# Patient Record
Sex: FEMALE | Race: WHITE | NOT HISPANIC OR LATINO
[De-identification: names, ages, dates, MRNs, and addresses within clinical notes are randomized per-mention and may not be internally consistent; named-entity substitution may affect disease eponyms.]

---

## 2020-03-27 ENCOUNTER — APPOINTMENT (OUTPATIENT)
Dept: FAMILY MEDICINE | Facility: CLINIC | Age: 30
End: 2020-03-27
Payer: COMMERCIAL

## 2020-03-27 ENCOUNTER — APPOINTMENT (OUTPATIENT)
Dept: FAMILY MEDICINE | Facility: CLINIC | Age: 30
End: 2020-03-27

## 2020-03-27 VITALS
HEIGHT: 62 IN | WEIGHT: 135 LBS | BODY MASS INDEX: 24.84 KG/M2 | HEART RATE: 101 BPM | OXYGEN SATURATION: 98 % | TEMPERATURE: 98.8 F

## 2020-03-27 VITALS — SYSTOLIC BLOOD PRESSURE: 99 MMHG | DIASTOLIC BLOOD PRESSURE: 70 MMHG | HEART RATE: 77 BPM

## 2020-03-27 DIAGNOSIS — Z87.2 PERSONAL HISTORY OF DISEASES OF THE SKIN AND SUBCUTANEOUS TISSUE: ICD-10-CM

## 2020-03-27 DIAGNOSIS — Z00.00 ENCOUNTER FOR GENERAL ADULT MEDICAL EXAMINATION W/OUT ABNORMAL FINDINGS: ICD-10-CM

## 2020-03-27 DIAGNOSIS — Z83.3 FAMILY HISTORY OF DIABETES MELLITUS: ICD-10-CM

## 2020-03-27 PROCEDURE — 36415 COLL VENOUS BLD VENIPUNCTURE: CPT

## 2020-03-27 PROCEDURE — 99205 OFFICE O/P NEW HI 60 MIN: CPT | Mod: 25

## 2020-03-27 RX ORDER — TACROLIMUS 0.3 MG/G
0.03 OINTMENT TOPICAL TWICE DAILY
Qty: 1 | Refills: 0 | Status: ACTIVE | COMMUNITY
Start: 2020-03-27 | End: 1900-01-01

## 2020-03-27 NOTE — REVIEW OF SYSTEMS
[Itching] : Itching [Skin Rash] : skin rash [Negative] : Heme/Lymph [de-identified] : generalized pruritic skin rash

## 2020-03-27 NOTE — HEALTH RISK ASSESSMENT
[Very Good] : ~his/her~  mood as very good [Yes] : Yes [Monthly or less (1 pt)] : Monthly or less (1 point) [1 or 2 (0 pts)] : 1 or 2 (0 points) [Never (0 pts)] : Never (0 points) [No] : In the past 12 months have you used drugs other than those required for medical reasons? No [No falls in past year] : Patient reported no falls in the past year [0] : 2) Feeling down, depressed, or hopeless: Not at all (0) [Patient declined Retinal Exam] : Patient declined Retinal Exam. [Patient declined mammogram] : Patient declined mammogram [Patient reported PAP Smear was normal] : Patient reported PAP Smear was normal [Patient declined bone density test] : Patient declined bone density test [Patient declined colonoscopy] : Patient declined colonoscopy [HIV test declined] : HIV test declined [Hepatitis C test declined] : Hepatitis C test declined [None] : None [With Family] : lives with family [# of Members in Household ___] :  household currently consist of [unfilled] member(s) [Employed] : employed [College] : College [Significant Other] : lives with significant other [Sexually Active] : sexually active [Feels Safe at Home] : Feels safe at home [Fully functional (bathing, dressing, toileting, transferring, walking, feeding)] : Fully functional (bathing, dressing, toileting, transferring, walking, feeding) [Fully functional (using the telephone, shopping, preparing meals, housekeeping, doing laundry, using] : Fully functional and needs no help or supervision to perform IADLs (using the telephone, shopping, preparing meals, housekeeping, doing laundry, using transportation, managing medications and managing finances) [Reports normal functional visual acuity (ie: able to read med bottle)] : Reports normal functional visual acuity [Smoke Detector] : smoke detector [Carbon Monoxide Detector] : carbon monoxide detector [Guns at Home] : guns at home [Safety elements used in home] : safety elements used in home [Seat Belt] :  uses seat belt [Sunscreen] : uses sunscreen [Patient/Caregiver not ready to engage] : Patient/Caregiver not ready to engage [] : No [Audit-CScore] : 0 [de-identified] : was moderately active going to the gym daily prior to rash symptoms onset [de-identified] : lean meats, protein, carbohydrates, vegetables, fruits, fast food rarely. [[id number]] : 0 [EyeExamDate] : goes to optometry every 2 years [Change in mental status noted] : No change in mental status noted [Language] : denies difficulty with language [High Risk Behavior] : no high risk behavior [Reports changes in hearing] : Reports no changes in hearing [Reports changes in vision] : Reports no changes in vision [Reports changes in dental health] : Reports no changes in dental health [Travel to Developing Areas] : does not  travel to developing areas [TB Exposure] : is not being exposed to tuberculosis [Caregiver Concerns] : does not have caregiver concerns [PapSmearDate] : 12/19 [FreeTextEntry2] : VAUGHN pina [de-identified] : monogamous relationship [de-identified] : goes every year for cleaning/check up [de-identified] : keeps ammunition locked away from gun which is also locked up.

## 2020-03-27 NOTE — COUNSELING
[Behavioral health counseling provided] : Behavioral health counseling provided [Sleep ___ hours/day] : Sleep [unfilled] hours/day [Engage in a relaxing activity] : Engage in a relaxing activity [Plan in advance] : Plan in advance [None] : None [Good understanding] : Patient has a good understanding of lifestyle changes and steps needed to achieve self management goal [de-identified] : Eczema Care: apply ointment as Rx, you can also keep skin moisturized with lotions approved for eczema skin care. \par do not scratch affected areas. choose mild soaps/shampoos approved for eczema care (without dyes) and bath or shower at least once daily, 2-3x/week, in lukewarm water for 10-15min, for optimal results, apply lotions with skin still moist. \par \par Maintain balanced diet of whole grain, fruits and vegetables, lean meats, skinless poultry, fish, beans, soy products, eggs, nuts, and low fat dairy as per FDA dietary guidelines. \par Avoid high calorie/low nutrient foods. \par vegetables/fruits- at least 5 servings/day, \par whole grains- 100% whole grain and at least 3 grams fiber/day.\par reduce/eliminate saturated fat- less than 5% on nutrition labels (ex. patel, deli meat, hot dogs, fried foods, cookies, ice cream, chips, soft drinks, etc.)\par stay within your FDA recommended daily calorie needs or use the plate method to portion intake. \par Avoid fast food and limit eating out. When eating out choose healthy alternatives (ex. grilled, baked, steamed. low fat dressings. avoid french fries).\par DO NOT CONSUME FOODS YOU ARE ALLERGIC TO DESPITE DIETARY RECOMMENDATIONS.\par \par For more information you can visit [email or web address] \par \par Incorporate regular physical activity most days of the week. \par Regular aerobic activity- moderate intensity, most days/wk, at least 30min/day- ex. brisk walk 2.5miles/hr, ballroom dancing, water aerobics, light yard work (raking/lawn mowing) actively playing basketball, biking 10miles/hr.\par Muscle strengthening and strength/resistance exercises 2-3days/wk- ex. free weights, resistance bands, your own weight (squats/pull-ups/push-ups).\par Neuro-motor exercises for balance/agility/coordination and flexibility exercises 2-3days/wk, 20-30min/day- ex. yoga and eulalia-chi.\par Bone strengthening at least 30min/day, most days of the week- ex. weights, resistance bands, running, brisk walking, jumping rope, stair climbing, tennis.\par Decrease sedentary time. \par LISTEN TO YOUR BODY AND MODIFY ACTIVITY AS NEEDED- DO NOT OVEREXERT YOURSELF DURING PHYSICAL ACTIVITY DESPITE RECOMMENDATION.\par \par For more information you can visit [email or web address].gov

## 2020-03-27 NOTE — CURRENT MEDS
[None] : Patient does not have any barriers to medication adherence [FreeTextEntry1] : no current medication

## 2020-03-27 NOTE — PLAN
[FreeTextEntry1] : labs checked today- f/u results\par EKG deferred for today r/t skin rash symptoms; patient to RTO once s/s resolve to complete as prescribed.\par Start Tacrolimus ointment as prescribed for treatment of Nummular Eczema. notify our office if any s/s side effects or adverse reaction. for serious/potentially life threatening reactions- go to nearest emergency department and then notify our office.\par Eczema care as discussed; view counseling section.\par f/u with this office in 1 week for recheck via telehealth visit as discussed.\par notify office if worsening/persistent symptoms or new onset complaints/concerns, in the event of any emergency or life threatening condition, go to the nearest emergency department and then notify office. \par patient verbalized understanding and agrees with plan of care.

## 2020-03-27 NOTE — REVIEW OF SYSTEMS
[Itching] : Itching [Skin Rash] : skin rash [Negative] : Heme/Lymph [de-identified] : generalized pruritic skin rash

## 2020-03-27 NOTE — COUNSELING
[Behavioral health counseling provided] : Behavioral health counseling provided [Sleep ___ hours/day] : Sleep [unfilled] hours/day [Engage in a relaxing activity] : Engage in a relaxing activity [Plan in advance] : Plan in advance [None] : None [Good understanding] : Patient has a good understanding of lifestyle changes and steps needed to achieve self management goal [de-identified] : Eczema Care: apply ointment as Rx, you can also keep skin moisturized with lotions approved for eczema skin care. \par do not scratch affected areas. choose mild soaps/shampoos approved for eczema care (without dyes) and bath or shower at least once daily, 2-3x/week, in lukewarm water for 10-15min, for optimal results, apply lotions with skin still moist. \par \par Maintain balanced diet of whole grain, fruits and vegetables, lean meats, skinless poultry, fish, beans, soy products, eggs, nuts, and low fat dairy as per FDA dietary guidelines. \par Avoid high calorie/low nutrient foods. \par vegetables/fruits- at least 5 servings/day, \par whole grains- 100% whole grain and at least 3 grams fiber/day.\par reduce/eliminate saturated fat- less than 5% on nutrition labels (ex. patel, deli meat, hot dogs, fried foods, cookies, ice cream, chips, soft drinks, etc.)\par stay within your FDA recommended daily calorie needs or use the plate method to portion intake. \par Avoid fast food and limit eating out. When eating out choose healthy alternatives (ex. grilled, baked, steamed. low fat dressings. avoid french fries).\par DO NOT CONSUME FOODS YOU ARE ALLERGIC TO DESPITE DIETARY RECOMMENDATIONS.\par \par For more information you can visit [email or web address] \par \par Incorporate regular physical activity most days of the week. \par Regular aerobic activity- moderate intensity, most days/wk, at least 30min/day- ex. brisk walk 2.5miles/hr, ballroom dancing, water aerobics, light yard work (raking/lawn mowing) actively playing basketball, biking 10miles/hr.\par Muscle strengthening and strength/resistance exercises 2-3days/wk- ex. free weights, resistance bands, your own weight (squats/pull-ups/push-ups).\par Neuro-motor exercises for balance/agility/coordination and flexibility exercises 2-3days/wk, 20-30min/day- ex. yoga and eulalia-chi.\par Bone strengthening at least 30min/day, most days of the week- ex. weights, resistance bands, running, brisk walking, jumping rope, stair climbing, tennis.\par Decrease sedentary time. \par LISTEN TO YOUR BODY AND MODIFY ACTIVITY AS NEEDED- DO NOT OVEREXERT YOURSELF DURING PHYSICAL ACTIVITY DESPITE RECOMMENDATION.\par \par For more information you can visit [email or web address].gov

## 2020-03-27 NOTE — HEALTH RISK ASSESSMENT
[Very Good] : ~his/her~  mood as very good [Yes] : Yes [Monthly or less (1 pt)] : Monthly or less (1 point) [1 or 2 (0 pts)] : 1 or 2 (0 points) [Never (0 pts)] : Never (0 points) [No] : In the past 12 months have you used drugs other than those required for medical reasons? No [No falls in past year] : Patient reported no falls in the past year [0] : 2) Feeling down, depressed, or hopeless: Not at all (0) [Patient declined Retinal Exam] : Patient declined Retinal Exam. [Patient declined mammogram] : Patient declined mammogram [Patient reported PAP Smear was normal] : Patient reported PAP Smear was normal [Patient declined bone density test] : Patient declined bone density test [Patient declined colonoscopy] : Patient declined colonoscopy [HIV test declined] : HIV test declined [Hepatitis C test declined] : Hepatitis C test declined [None] : None [With Family] : lives with family [# of Members in Household ___] :  household currently consist of [unfilled] member(s) [Employed] : employed [College] : College [Significant Other] : lives with significant other [Sexually Active] : sexually active [Feels Safe at Home] : Feels safe at home [Fully functional (bathing, dressing, toileting, transferring, walking, feeding)] : Fully functional (bathing, dressing, toileting, transferring, walking, feeding) [Fully functional (using the telephone, shopping, preparing meals, housekeeping, doing laundry, using] : Fully functional and needs no help or supervision to perform IADLs (using the telephone, shopping, preparing meals, housekeeping, doing laundry, using transportation, managing medications and managing finances) [Reports normal functional visual acuity (ie: able to read med bottle)] : Reports normal functional visual acuity [Smoke Detector] : smoke detector [Carbon Monoxide Detector] : carbon monoxide detector [Guns at Home] : guns at home [Safety elements used in home] : safety elements used in home [Seat Belt] :  uses seat belt [Sunscreen] : uses sunscreen [Patient/Caregiver not ready to engage] : Patient/Caregiver not ready to engage [] : No [Audit-CScore] : 0 [de-identified] : was moderately active going to the gym daily prior to rash symptoms onset [de-identified] : lean meats, protein, carbohydrates, vegetables, fruits, fast food rarely. [[id number]] : 0 [EyeExamDate] : goes to optometry every 2 years [Change in mental status noted] : No change in mental status noted [Language] : denies difficulty with language [High Risk Behavior] : no high risk behavior [Reports changes in hearing] : Reports no changes in hearing [Reports changes in vision] : Reports no changes in vision [Reports changes in dental health] : Reports no changes in dental health [Travel to Developing Areas] : does not  travel to developing areas [TB Exposure] : is not being exposed to tuberculosis [Caregiver Concerns] : does not have caregiver concerns [PapSmearDate] : 12/19 [FreeTextEntry2] : VAUGHN pina [de-identified] : monogamous relationship [de-identified] : goes every year for cleaning/check up [de-identified] : keeps ammunition locked away from gun which is also locked up.

## 2020-03-27 NOTE — PHYSICAL EXAM
[No Acute Distress] : no acute distress [Well Nourished] : well nourished [Normal Sclera/Conjunctiva] : normal sclera/conjunctiva [PERRL] : pupils equal round and reactive to light [EOMI] : extraocular movements intact [Normal Outer Ear/Nose] : the outer ears and nose were normal in appearance [Normal Oropharynx] : the oropharynx was normal [Normal TMs] : both tympanic membranes were normal [Declined Breast Exam] : declined breast exam  [Normal Posterior Cervical Nodes] : no posterior cervical lymphadenopathy [Normal Anterior Cervical Nodes] : no anterior cervical lymphadenopathy [Normal] : no joint swelling and grossly normal strength and tone [Eczema] : eczema [Generalized] : diffusely in mutiple areas [Lesions On Scalp] : on the scalp [Lesions On Neck Posterior Bilateral] : on both sides of the back of the neck [Chest] : on the chest [Back] : on the back [Abdomen] : on the abdomen [Lesions Upper Extremities] : on the arms [Coordination Grossly Intact] : coordination grossly intact [Normal Gait] : normal gait [Normal Affect] : the affect was normal [Normal Insight/Judgement] : insight and judgment were intact [FreeTextEntry2] : nummular eczema

## 2020-03-27 NOTE — HISTORY OF PRESENT ILLNESS
[FreeTextEntry1] : this is a new patient here today to establish care [de-identified] : This is a 30y/o  female here today to establish care with new PCP. patient denies having PCP, reports has not been seen for regular doctor f/u visits in "years". reports only having acute visit with urgent care facilities. \par PMH includes eczema, diagnosed about 5 years ago.\par PSH include tooth extractions x3 in 2017, o/w denies pertinent. \par today with c/o generalized rash; patient reports eczema rash usually localized to right ankle, treated with steroid cream in the past and resolves. patient reports eczema rash returned; localized to LLQ abdomen and now spreading to b/l arms, upper/lower back, edge of scalp; reported as pruritic. been occurring for 2 weeks, some improvement of symptoms with Eucerin eczema relief cream applied as directed, patient reports showering frequently with dove soap. denies having recently changed laundering products, lotions, shampoos, perfumes, food intake, etc. reports having been in increased stress environment; works as , work has been "hectic r/t COVID19 pandemic". o/w no known trigger for exacerbation. \par o/w no major concerns; denies other major accidents, injuries, illnesses, hospitalizations.

## 2020-03-27 NOTE — HISTORY OF PRESENT ILLNESS
[FreeTextEntry1] : this is a new patient here today to establish care [de-identified] : This is a 28y/o  female here today to establish care with new PCP. patient denies having PCP, reports has not been seen for regular doctor f/u visits in "years". reports only having acute visit with urgent care facilities. \par PMH includes eczema, diagnosed about 5 years ago.\par PSH include tooth extractions x3 in 2017, o/w denies pertinent. \par today with c/o generalized rash; patient reports eczema rash usually localized to right ankle, treated with steroid cream in the past and resolves. patient reports eczema rash returned; localized to LLQ abdomen and now spreading to b/l arms, upper/lower back, edge of scalp; reported as pruritic. been occurring for 2 weeks, some improvement of symptoms with Eucerin eczema relief cream applied as directed, patient reports showering frequently with dove soap. denies having recently changed laundering products, lotions, shampoos, perfumes, food intake, etc. reports having been in increased stress environment; works as , work has been "hectic r/t COVID19 pandemic". o/w no known trigger for exacerbation. \par o/w no major concerns; denies other major accidents, injuries, illnesses, hospitalizations.

## 2020-03-30 LAB
25(OH)D3 SERPL-MCNC: 30.8 NG/ML
ALBUMIN SERPL ELPH-MCNC: 4.4 G/DL
ALP BLD-CCNC: 44 U/L
ALT SERPL-CCNC: 11 U/L
ANION GAP SERPL CALC-SCNC: 14 MMOL/L
AST SERPL-CCNC: 14 U/L
BASOPHILS # BLD AUTO: 0.07 K/UL
BASOPHILS NFR BLD AUTO: 1 %
BILIRUB SERPL-MCNC: 0.3 MG/DL
BUN SERPL-MCNC: 14 MG/DL
CALCIUM SERPL-MCNC: 9.3 MG/DL
CHLORIDE SERPL-SCNC: 103 MMOL/L
CHOLEST SERPL-MCNC: 169 MG/DL
CHOLEST/HDLC SERPL: 2.8 RATIO
CO2 SERPL-SCNC: 24 MMOL/L
CREAT SERPL-MCNC: 0.76 MG/DL
EOSINOPHIL # BLD AUTO: 0.31 K/UL
EOSINOPHIL NFR BLD AUTO: 4.6 %
ESTIMATED AVERAGE GLUCOSE: 97 MG/DL
GLUCOSE SERPL-MCNC: 78 MG/DL
HBA1C MFR BLD HPLC: 5 %
HCT VFR BLD CALC: 42.4 %
HDLC SERPL-MCNC: 61 MG/DL
HGB BLD-MCNC: 14.1 G/DL
IMM GRANULOCYTES NFR BLD AUTO: 0.3 %
LDLC SERPL CALC-MCNC: 88 MG/DL
LYMPHOCYTES # BLD AUTO: 2.04 K/UL
LYMPHOCYTES NFR BLD AUTO: 30.4 %
MAN DIFF?: NORMAL
MCHC RBC-ENTMCNC: 29 PG
MCHC RBC-ENTMCNC: 33.3 GM/DL
MCV RBC AUTO: 87.1 FL
MONOCYTES # BLD AUTO: 0.5 K/UL
MONOCYTES NFR BLD AUTO: 7.4 %
NEUTROPHILS # BLD AUTO: 3.78 K/UL
NEUTROPHILS NFR BLD AUTO: 56.3 %
PLATELET # BLD AUTO: 321 K/UL
POTASSIUM SERPL-SCNC: 4.3 MMOL/L
PROT SERPL-MCNC: 6.8 G/DL
RBC # BLD: 4.87 M/UL
RBC # FLD: 12.6 %
SODIUM SERPL-SCNC: 141 MMOL/L
T4 FREE SERPL-MCNC: 1.1 NG/DL
TRIGL SERPL-MCNC: 100 MG/DL
TSH SERPL-ACNC: 1.73 UIU/ML
WBC # FLD AUTO: 6.72 K/UL

## 2020-04-02 ENCOUNTER — APPOINTMENT (OUTPATIENT)
Dept: FAMILY MEDICINE | Facility: CLINIC | Age: 30
End: 2020-04-02
Payer: COMMERCIAL

## 2020-04-02 ENCOUNTER — APPOINTMENT (OUTPATIENT)
Dept: FAMILY MEDICINE | Facility: CLINIC | Age: 30
End: 2020-04-02

## 2020-04-02 PROCEDURE — 99441: CPT

## 2020-05-08 ENCOUNTER — APPOINTMENT (OUTPATIENT)
Dept: FAMILY MEDICINE | Facility: CLINIC | Age: 30
End: 2020-05-08
Payer: COMMERCIAL

## 2020-05-08 VITALS
DIASTOLIC BLOOD PRESSURE: 70 MMHG | BODY MASS INDEX: 23 KG/M2 | SYSTOLIC BLOOD PRESSURE: 110 MMHG | HEIGHT: 62 IN | WEIGHT: 125 LBS

## 2020-05-08 DIAGNOSIS — S09.92XA UNSPECIFIED INJURY OF NOSE, INITIAL ENCOUNTER: ICD-10-CM

## 2020-05-08 DIAGNOSIS — R23.8 OTHER SKIN CHANGES: ICD-10-CM

## 2020-05-08 DIAGNOSIS — J34.89 OTHER SPECIFIED DISORDERS OF NOSE AND NASAL SINUSES: ICD-10-CM

## 2020-05-08 DIAGNOSIS — Z87.2 PERSONAL HISTORY OF DISEASES OF THE SKIN AND SUBCUTANEOUS TISSUE: ICD-10-CM

## 2020-05-08 PROCEDURE — 36415 COLL VENOUS BLD VENIPUNCTURE: CPT

## 2020-05-08 PROCEDURE — 99214 OFFICE O/P EST MOD 30 MIN: CPT | Mod: 25

## 2020-05-08 NOTE — PLAN
[FreeTextEntry1] : labs checked today- NP to f/u; consider referral to hem/oc pending values.\par f/u with ENT as discussed.\par patient to have all reports sent to our office for review and record keeping.\par conservative measures for symptoms management; ice as directed.\par Tylenol as directed for pain. \par RTO for recheck of s/s in 2 weeks, sooner if needed. \par notify office if worsening/persistent symptoms or new onset complaints/concerns, in the event of any emergency or life threatening condition, go to the nearest emergency department and then notify office. \par patient verbalized understanding and agrees with plan of care.

## 2020-05-08 NOTE — PHYSICAL EXAM
[No Acute Distress] : no acute distress [Well Nourished] : well nourished [Well Developed] : well developed [PERRL] : pupils equal round and reactive to light [Normal Sclera/Conjunctiva] : normal sclera/conjunctiva [Normal Outer Ear/Nose] : the outer ears and nose were normal in appearance [Normal Oropharynx] : the oropharynx was normal [Normal TMs] : both tympanic membranes were normal [No Lymphadenopathy] : no lymphadenopathy [Normal] : normal rate, regular rhythm, normal S1 and S2 and no murmur heard [No Edema] : there was no peripheral edema [Normal Posterior Cervical Nodes] : no posterior cervical lymphadenopathy [Normal Anterior Cervical Nodes] : no anterior cervical lymphadenopathy [No Rash] : no rash [Coordination Grossly Intact] : coordination grossly intact [Normal Affect] : the affect was normal [Normal Gait] : normal gait [Normal Insight/Judgement] : insight and judgment were intact [de-identified] : nasal mucosa inflamed b/l. external tissue surrounding bridge of nose with mild swelling and ecchymosis o/w bridge and septum intact with no visible or palpable deformity b/l. had patient blow nose; no visible discomfort, no abnormal nasal discharge.  [de-identified] : tissue surrounding bridge of nose very slightly ecchymotic.

## 2020-05-08 NOTE — HISTORY OF PRESENT ILLNESS
[FreeTextEntry1] : bridge of nose swollen [de-identified] : This is a 30y/o  female here today for c/o 5/10 pain/tenderness to touch, swelling, and ecchymosis to b/l bridge of nose. reports waking up from sleep with acute symptoms 5/4/20. 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 patient denies pain woke her up from sleeping. reports pain is more tenderness to touch. denies use of or having snorted or inhaled harsh chemicals or agents including use of cocaine. 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 denies exposure to noxious/harmful chemicals. denies issue of abuse in home or in place of employment. reports use of sinus rinse for relief as patient thought this may be a sinus infection. denies any nose drainage with use of saline rinse or relief of symptoms; reports scant pink colored drainage with blowing nose prior to use, o/w denies abnormal nasal discharge. denies nasal congestion/sinus congestion or changes in smell or taste. \par o/w no major concerns.

## 2020-05-08 NOTE — REVIEW OF SYSTEMS
[Easy Bruising] : easy bruising [Negative] : Psychiatric [Easy Bleeding] : no easy bleeding [Swollen Glands] : no swollen glands [FreeTextEntry4] : swelling/ecchymosis to b/l bridge of nose, tender to touch.  [de-identified] : ecchymosis b/l nose with no known trauma

## 2020-05-08 NOTE — HEALTH RISK ASSESSMENT
[No] : In the past 12 months have you used drugs other than those required for medical reasons? No [No falls in past year] : Patient reported no falls in the past year [0] : 2) Feeling down, depressed, or hopeless: Not at all (0) [] : No [[id number]] : 0

## 2020-05-08 NOTE — CURRENT MEDS
[None] : Patient does not have any barriers to medication adherence [FreeTextEntry1] : not currently using medication; eczema resolved for now.

## 2020-05-11 LAB
APTT BLD: 29.1 SEC
BASOPHILS # BLD AUTO: 0.05 K/UL
BASOPHILS NFR BLD AUTO: 0.8 %
EOSINOPHIL # BLD AUTO: 0.24 K/UL
EOSINOPHIL NFR BLD AUTO: 3.8 %
FERRITIN SERPL-MCNC: 46 NG/ML
FOLATE SERPL-MCNC: 8.8 NG/ML
HCT VFR BLD CALC: 43.4 %
HGB BLD-MCNC: 13.8 G/DL
IMM GRANULOCYTES NFR BLD AUTO: 0.3 %
IRON SATN MFR SERPL: 15 %
IRON SERPL-MCNC: 55 UG/DL
LYMPHOCYTES # BLD AUTO: 1.81 K/UL
LYMPHOCYTES NFR BLD AUTO: 28.9 %
MAN DIFF?: NORMAL
MCHC RBC-ENTMCNC: 28.5 PG
MCHC RBC-ENTMCNC: 31.8 GM/DL
MCV RBC AUTO: 89.7 FL
MONOCYTES # BLD AUTO: 0.52 K/UL
MONOCYTES NFR BLD AUTO: 8.3 %
NEUTROPHILS # BLD AUTO: 3.63 K/UL
NEUTROPHILS NFR BLD AUTO: 57.9 %
PLATELET # BLD AUTO: 351 K/UL
RBC # BLD: 4.84 M/UL
RBC # FLD: 13.2 %
TIBC SERPL-MCNC: 371 UG/DL
UIBC SERPL-MCNC: 315 UG/DL
WBC # FLD AUTO: 6.27 K/UL

## 2022-01-05 ENCOUNTER — TRANSCRIPTION ENCOUNTER (OUTPATIENT)
Age: 32
End: 2022-01-05